# Patient Record
Sex: MALE | Race: WHITE
[De-identification: names, ages, dates, MRNs, and addresses within clinical notes are randomized per-mention and may not be internally consistent; named-entity substitution may affect disease eponyms.]

---

## 2018-05-01 ENCOUNTER — HOSPITAL ENCOUNTER (INPATIENT)
Dept: HOSPITAL 72 - SDSOVERFLO | Age: 53
LOS: 1 days | Discharge: HOME | DRG: 472 | End: 2018-05-02
Payer: COMMERCIAL

## 2018-05-01 VITALS — DIASTOLIC BLOOD PRESSURE: 72 MMHG | SYSTOLIC BLOOD PRESSURE: 141 MMHG

## 2018-05-01 VITALS — DIASTOLIC BLOOD PRESSURE: 87 MMHG | SYSTOLIC BLOOD PRESSURE: 146 MMHG

## 2018-05-01 VITALS — SYSTOLIC BLOOD PRESSURE: 146 MMHG | DIASTOLIC BLOOD PRESSURE: 87 MMHG

## 2018-05-01 VITALS — WEIGHT: 315 LBS | HEIGHT: 68 IN | BODY MASS INDEX: 47.74 KG/M2

## 2018-05-01 VITALS — SYSTOLIC BLOOD PRESSURE: 133 MMHG | DIASTOLIC BLOOD PRESSURE: 66 MMHG

## 2018-05-01 VITALS — DIASTOLIC BLOOD PRESSURE: 64 MMHG | SYSTOLIC BLOOD PRESSURE: 134 MMHG

## 2018-05-01 VITALS — SYSTOLIC BLOOD PRESSURE: 150 MMHG | DIASTOLIC BLOOD PRESSURE: 90 MMHG

## 2018-05-01 VITALS — DIASTOLIC BLOOD PRESSURE: 91 MMHG | SYSTOLIC BLOOD PRESSURE: 134 MMHG

## 2018-05-01 VITALS — SYSTOLIC BLOOD PRESSURE: 139 MMHG | DIASTOLIC BLOOD PRESSURE: 70 MMHG

## 2018-05-01 VITALS — DIASTOLIC BLOOD PRESSURE: 82 MMHG | SYSTOLIC BLOOD PRESSURE: 130 MMHG

## 2018-05-01 VITALS — DIASTOLIC BLOOD PRESSURE: 86 MMHG | SYSTOLIC BLOOD PRESSURE: 149 MMHG

## 2018-05-01 VITALS — DIASTOLIC BLOOD PRESSURE: 65 MMHG | SYSTOLIC BLOOD PRESSURE: 133 MMHG

## 2018-05-01 DIAGNOSIS — E66.01: ICD-10-CM

## 2018-05-01 DIAGNOSIS — M50.022: ICD-10-CM

## 2018-05-01 DIAGNOSIS — M50.122: Primary | ICD-10-CM
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
## 2018-05-01 PROCEDURE — 76001: CPT

## 2018-05-01 PROCEDURE — 72040 X-RAY EXAM NECK SPINE 2-3 VW: CPT

## 2018-05-01 PROCEDURE — 86850 RBC ANTIBODY SCREEN: CPT

## 2018-05-01 PROCEDURE — 94003 VENT MGMT INPAT SUBQ DAY: CPT

## 2018-05-01 PROCEDURE — 0RG10A0 FUSION OF CERVICAL VERTEBRAL JOINT WITH INTERBODY FUSION DEVICE, ANTERIOR APPROACH, ANTERIOR COLUMN, OPEN APPROACH: ICD-10-PCS

## 2018-05-01 PROCEDURE — 87081 CULTURE SCREEN ONLY: CPT

## 2018-05-01 PROCEDURE — 36415 COLL VENOUS BLD VENIPUNCTURE: CPT

## 2018-05-01 PROCEDURE — 07DR3ZZ EXTRACTION OF ILIAC BONE MARROW, PERCUTANEOUS APPROACH: ICD-10-PCS

## 2018-05-01 PROCEDURE — 0RT30ZZ RESECTION OF CERVICAL VERTEBRAL DISC, OPEN APPROACH: ICD-10-PCS

## 2018-05-01 PROCEDURE — 80048 BASIC METABOLIC PNL TOTAL CA: CPT

## 2018-05-01 PROCEDURE — 86900 BLOOD TYPING SEROLOGIC ABO: CPT

## 2018-05-01 PROCEDURE — 86901 BLOOD TYPING SEROLOGIC RH(D): CPT

## 2018-05-01 PROCEDURE — 94150 VITAL CAPACITY TEST: CPT

## 2018-05-01 RX ADMIN — DEXTROSE AND SODIUM CHLORIDE SCH MLS/HR: 5; .45 INJECTION, SOLUTION INTRAVENOUS at 18:24

## 2018-05-01 RX ADMIN — MORPHINE SULFATE PRN MG: 4 INJECTION INTRAVENOUS at 16:07

## 2018-05-01 RX ADMIN — DEXTROSE AND SODIUM CHLORIDE SCH MLS/HR: 5; .45 INJECTION, SOLUTION INTRAVENOUS at 15:20

## 2018-05-01 RX ADMIN — MORPHINE SULFATE PRN MG: 4 INJECTION INTRAVENOUS at 21:12

## 2018-05-01 RX ADMIN — Medication SCH MLS/HR: at 21:31

## 2018-05-01 RX ADMIN — DOCUSATE SODIUM SCH MG: 100 CAPSULE, LIQUID FILLED ORAL at 18:23

## 2018-05-01 RX ADMIN — DOCUSATE SODIUM - SENNOSIDES SCH TAB: 50; 8.6 TABLET, FILM COATED ORAL at 18:23

## 2018-05-01 RX ADMIN — DEXTROSE AND SODIUM CHLORIDE SCH MLS/HR: 5; .45 INJECTION, SOLUTION INTRAVENOUS at 23:28

## 2018-05-01 RX ADMIN — HYDROCODONE BITARTRATE AND ACETAMINOPHEN PRN TAB: 7.5; 325 TABLET ORAL at 20:06

## 2018-05-01 NOTE — GENERAL PROGRESS NOTE
Progress Note


Progress Note


Neurosurgery Recovery room





S/ Comfortagle.  No arm apin





O/





Last 24 Hour Vital Signs








  Date Time  Temp Pulse Resp B/P (MAP) Pulse Ox O2 Delivery O2 Flow Rate FiO2


 


5/1/18 12:52 207.0 74 16  97   


 


5/1/18 07:26 98.0 64 20 134/91 97 Room Air  





 98.0       





Alert and oriented


Moves all extremities well to command


Incision dressings are dry


Patient C-collar and comfortable.





Doing well


Admit after recovery to floor.











RIKY MOORE May 1, 2018 13:18

## 2018-05-01 NOTE — PRE-PROCEDURE NOTE/ATTESTATION
Pre-Procedure Note/Attestation


Complete Prior to Procedure


Planned Procedure:  bilateral


Procedure Narrative:


Anterior cervical discectomy, interbody fusion with PEEK graft and arthrodesis 

and use of allograft, autograft and iliac crest bone marrow aspirate.





Attestation


I attest that I discussed the nature of the procedure; its benefits; risks and 

complications; and alternatives (and the risks and benefits of such alternatives

), prior to the procedure, with the patient (or the patient's legal 

representative).





I attest that, if there was a reasonable possibility of needing a blood 

transfusion, the patient (or the patient's legal representative) was given the 

[facility] standardized written summary, pursuant 

to the Heriberto Hermelindo Blood Safety Act ([place] Health and Safety Code # 1645, as 

amended).





I attest that I re-evaluated the patient just prior to the surgery and that 

there has been no change in the patient's H&P, except as documented below:











RIKY MOORE May 1, 2018 08:57
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
## 2018-05-01 NOTE — DIAGNOSTIC IMAGING REPORT
Indication: Neck Pain

 

Findings: 13 fluoroscopic views of the cervical spine were obtained.

 

Intraoperative imaging demonstrating localization of the cervical spine followed by

discectomy at C5-6. The shoulders obscure final images. Correlate with postoperative

standard radiographic views.

 

IMPRESSION:

 

Intraoperative imaging

## 2018-05-01 NOTE — IMMEDIATE POST-OP EVALUATION
Immediate Post-Op Evalulation


Immediate Post-Op Evalulation


Procedure:  ACDF C5-6


Date of Evaluation:  May 1, 2018


Time of Evaluation:  13:05


IV Fluids:  1300 LR


Blood Products:  0


Estimated Blood Loss:  25


Urinary Output:  0


Blood Pressure Systolic:  130


Blood Pressure Diastolic:  82


Pulse Rate:  74


Respiratory Rate:  16


O2 Sat by Pulse Oximetry:  97


Temperature (Fahrenheit):  97.2


Pain Score (1-10):  3


Nausea:  No


Vomiting:  No


Complications


0


Patient Status:  awake, reacts, patent, extubated, none


Hydration Status:  adequate


Dru Gram Vancomycin IV


Given Within 1 Hr of Incision:  Yes


Time Given:  09:16











Alfonso Romano MD May 1, 2018 06:31

## 2018-05-01 NOTE — ANETHESIA PREOPERATIVE EVAL
Anesthesia Pre-op PMH/ROS


General


Date of Evaluation:  May 1, 2018


Time of Evaluation:  08:54


Anesthesiologist:  Rosalina


ASA Score:  ASA 3


Mallampati Score


Class I : Soft palate, uvula, fauces, pillars visible


Class II: Soft palate, uvula, fauces visible


Class III: Soft palate, base of uvula visible


Class IV: Only hard plate visible


Mallampati Classification:  Class III


Surgeon:  Maryana


Diagnosis:  Neck Pain


Surgical Procedure:  ACDF C5-6


Anesthesia History:  none


Family History:  no anesthesia problems


Allergies:  


Coded Allergies:  


     No Known Allergies (Unverified , 18)


Medications:  see eMAR





Past Medical History


Cardiovascular:  Reports: other - Syncope, Dizziness


PSxH Narrative:


R and L Knee SX, Umbilical Hernia





Anesthesia Pre-op Phys. Exam


Physician Exam





Last Vital Signs








  Date Time  Temp Pulse Resp B/P (MAP) Pulse Ox O2 Delivery O2 Flow Rate FiO2


 


18 07:26 98.0 64 20 134/91 97 Room Air  





 98.0       








Constitutional:  NAD


Neurologic:  CN 2-12 intact


Cardiovascular:  RRR


Respiratory:  CTA


Gastrointestinal:  S/NT/ND





Airway Exam


Mallampati Score:  Class III


MO:  limited


ROM:  limited


Teeth:  intact





Anesthesia Pre-op A/P


Risk Assessment & Plan


Assessment:


ASA 3


Plan:


GA, BIS, GlideScope


Status Change Before Surgery:  No





Pre-Antibiotics


Dru Gram Vancomycin IV


Given Within 1 Hr of Incision:  Yes


Time Given:  09:16











Alfonso Romano MD May 1, 2018 10:18
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
## 2018-05-02 VITALS — DIASTOLIC BLOOD PRESSURE: 79 MMHG | SYSTOLIC BLOOD PRESSURE: 129 MMHG

## 2018-05-02 VITALS — SYSTOLIC BLOOD PRESSURE: 152 MMHG | DIASTOLIC BLOOD PRESSURE: 74 MMHG

## 2018-05-02 VITALS — DIASTOLIC BLOOD PRESSURE: 83 MMHG | SYSTOLIC BLOOD PRESSURE: 147 MMHG

## 2018-05-02 VITALS — SYSTOLIC BLOOD PRESSURE: 127 MMHG | DIASTOLIC BLOOD PRESSURE: 78 MMHG

## 2018-05-02 VITALS — SYSTOLIC BLOOD PRESSURE: 112 MMHG | DIASTOLIC BLOOD PRESSURE: 66 MMHG

## 2018-05-02 LAB
ANION GAP SERPL CALC-SCNC: 9 MMOL/L (ref 5–15)
BUN SERPL-MCNC: 21 MG/DL (ref 7–18)
CALCIUM SERPL-MCNC: 8.4 MG/DL (ref 8.5–10.1)
CHLORIDE SERPL-SCNC: 100 MMOL/L (ref 98–107)
CO2 SERPL-SCNC: 24 MMOL/L (ref 21–32)
CREAT SERPL-MCNC: 1.2 MG/DL (ref 0.55–1.3)
POTASSIUM SERPL-SCNC: 4.2 MMOL/L (ref 3.5–5.1)
SODIUM SERPL-SCNC: 133 MMOL/L (ref 136–145)

## 2018-05-02 RX ADMIN — HYDROCODONE BITARTRATE AND ACETAMINOPHEN PRN TAB: 7.5; 325 TABLET ORAL at 03:01

## 2018-05-02 RX ADMIN — MAGNESIUM HYDROXIDE SCH ML: 400 SUSPENSION ORAL at 16:45

## 2018-05-02 RX ADMIN — DEXTROSE AND SODIUM CHLORIDE SCH MLS/HR: 5; .45 INJECTION, SOLUTION INTRAVENOUS at 06:38

## 2018-05-02 RX ADMIN — MAGNESIUM HYDROXIDE SCH ML: 400 SUSPENSION ORAL at 09:17

## 2018-05-02 RX ADMIN — Medication SCH MLS/HR: at 09:17

## 2018-05-02 RX ADMIN — DOCUSATE SODIUM - SENNOSIDES SCH TAB: 50; 8.6 TABLET, FILM COATED ORAL at 09:00

## 2018-05-02 RX ADMIN — DOCUSATE SODIUM SCH MG: 100 CAPSULE, LIQUID FILLED ORAL at 09:00

## 2018-05-02 NOTE — GENERAL PROGRESS NOTE
Progress Note


Progress Note


Neurosurgery POD#1





S/  Tolerating p's.  mbulated several times.  Incresae senstion in the upper 

extremities.








O/


Vs:





Vital Signs








  Date Time  Temp Pulse Resp B/P (MAP) Pulse Ox O2 Delivery O2 Flow Rate FiO2


 


5/1/18 07:26 98.0 64 20 134/91 97 Room Air  





 98.0       


 


5/1/18 12:54       8.0 





On exam,


Alert and oriented x 4


comfortable, pleasant and smiling


Motor 5/5 in the uppers and lowers


Incision is clean and dry at cervical and left hip.








Lbs:





Laboratory Tests








Test


  5/2/18


04:45


 


Sodium Level


  133 MMOL/L


(136-145)  L


 


Potassium Level


  4.2 MMOL/L


(3.5-5.1)


 


Chloride Level


  100 MMOL/L


()


 


Carbon Dioxide Level


  24 MMOL/L


(21-32)


 


Anion Gap


  9 mmol/L


(5-15)


 


Blood Urea Nitrogen


  21 mg/dL


(7-18)  H


 


Creatinine


  1.2 MG/DL


(0.55-1.30)


 


Estimat Glomerular Filtration


Rate > 60 mL/min


(>60)


 


Glucose Level


  147 MG/DL


()  H


 


Calcium Level


  8.4 MG/DL


(8.5-10.1)  L





doing well


restrict Free H2O d/w pt and his wife (x 4days)


Cervical soft collar


d/c instructions reviewed .











RIKY MOORE May 2, 2018 15:49

## 2018-05-03 NOTE — DISCHARGE SUMMARY
Discharge Summary


Hospital Course


Date of Admission


May 1, 2018 at 06:18


Date of Discharge


May 2, 2018 at 17:00


Admitting Diagnosis





HPI


Russell Scanlon is a 53 year old male who was admitted on May 1, 2018 at 06:

18 for Cervical Radiculopathy


Hospital Course


[id number]





Discharge


Discharge Disposition


Patient was discharged home











Susie Ragsdale NP May 3, 2018 18:53
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
## 2018-05-04 NOTE — 48 HOUR POST ANESTHESIA EVAL
Post Anesthesia Evaluation


Procedure:  ACDF C5-6


Date of Evaluation:  May 2, 2018


Airway:  patent


Nausea:  No


Vomiting:  No


Pain Intensity:  0


Hydration Status:  adequate


Cardiopulmonary Status:


at baseline


Mental Status/LOC:  patient returned to baseline


Post-Anesthesia Complications:


0


Follow-up care needed:  N/A - further care as per primary team











NAWAF BLACK M.D. May 4, 2018 08:32